# Patient Record
Sex: FEMALE | Race: WHITE | NOT HISPANIC OR LATINO | Employment: FULL TIME | URBAN - METROPOLITAN AREA
[De-identification: names, ages, dates, MRNs, and addresses within clinical notes are randomized per-mention and may not be internally consistent; named-entity substitution may affect disease eponyms.]

---

## 2017-05-31 ENCOUNTER — GENERIC CONVERSION - ENCOUNTER (OUTPATIENT)
Dept: OTHER | Facility: OTHER | Age: 57
End: 2017-05-31

## 2017-06-28 ENCOUNTER — GENERIC CONVERSION - ENCOUNTER (OUTPATIENT)
Dept: OTHER | Facility: OTHER | Age: 57
End: 2017-06-28

## 2017-09-20 ENCOUNTER — GENERIC CONVERSION - ENCOUNTER (OUTPATIENT)
Dept: OTHER | Facility: OTHER | Age: 57
End: 2017-09-20

## 2017-10-18 ENCOUNTER — GENERIC CONVERSION - ENCOUNTER (OUTPATIENT)
Dept: OTHER | Facility: OTHER | Age: 57
End: 2017-10-18

## 2018-01-12 NOTE — MISCELLANEOUS
Message   Recorded as Task   Date: 04/15/2016 10:12 AM, Created By: Usha Quintana   Task Name: Follow Up   Assigned To: Sang Delaney   Regarding Patient: Deep Malagon, Status: Active   Comment:    Olivia Foley - 15 Apr 2016 10:12 AM     TASK CREATED  Caller: Self; General Medical Question; [phone] (Mobile Phone)  Pt called me to schedule a bloodwork follow up appt and also had a question  She found a deer tick attached to her knee for less than 24 hours  It was attached and site looks red  Should an antibiotic be called in [facility]) or should she wait to see Dr Darian Montana on Tuesday 4/19  Please call her at [phone]  [facility] - 15 Apr 2016 10:17 AM     TASK EDITED  Please advise   Sang Delaney - 15 Apr 2016 6:02 PM     TASK EDITED  s/w pt   wlll prophylax but low risk by hx        Signatures   Electronically signed by : Apple Hinds DO;  Apr 15 2016  6:03PM EST                       (Author)

## 2018-01-14 NOTE — RESULT NOTES
Verified Results  [facility]) CMP14+eGFR [id number] 08:50AM Kat Hess     Test Name Result Flag Reference   Glucose, Serum 88 mg/dL  65-99   BUN 11 mg/dL     No patient age and/or gender provided                              Age                Male          Female                           0 - 11 months        3 - 25         3 - 25                           1 - 16 years         11 - 20         9 - 22                          21 - 44 years         10 - 25         8 - 21                          42 - 61 years         10 - 25         8 - 21                          42 - 80 years         8 - 32         8 - 32                              >89 years        8 - 38        11 - 36   Creatinine, Serum 0 66 mg/dL     No patient age and/or gender provided                              Age                Male          Female                           0 - 60 days          44 - 1 19      44 - 1 19                     61 days - 11 months        17 - 1 18      17 - 1 18                           1 -  2 years         19 -   42      19 -   42                           3 -  4 years         26 -   51      26 -   51                           5 -  6 years         30 -   59      30 -   59                           7 -  8 years         37 -   62      37 -   62                           9 - 10 years         39 -   70      39 -   70                          11 - 12 years         42 -   75      42 -   75                          13 - 14 years         49 -   90      49 -   90                              >14 years         76 - 1 27      57 - 1 00   eGFR If NonAfricn Am UNABL1 mL/min/1 73     Unable to calculate GFR  Age and/or sex not provided or age <19 years  old  eGFR If Africn Am UNABL1 mL/min/1 73     Unable to calculate GFR  Age and/or sex not provided or age <19 years  old     BUN/Creatinine Ratio 17     No patient age and/or gender provided                              Age                Male          Female                           0 - 16 years         5 - 32         9 - 21                          22 - 44 years         8 - 23         8 - 21                          42 - 61 years         9 - 21         9 - 21                              >59 years        10 - 22        11 - 26   Sodium, Serum 142 mmol/L  134-144   Potassium, Serum 4 5 mmol/L  3 5-5 2   Chloride, Serum 103 mmol/L     Carbon Dioxide, Total 25 mmol/L  18-29   Calcium, Serum 9 5 mg/dL     No patient age and/or gender provided                              Age                Male          Female                           0 - 10 days        8 6 - 10 4     8 6 - 10 4                     11 days -  1 year        9 2 - 11 0     9 2 - 11 0                           2 - 11 years       9 1 - 10 5     9 1 - 10 5                          12 - 17 years       8 9 - 10 4     8 9 - 10 4                          18 - 59 years       8 7 - 10 2     8 7 - 10 2                              >59 years       8 6 - 10 2     8 7 - 10 3   Protein, Total, Serum 6 7 g/dL  6 0-8 5   Albumin, Serum 4 4 g/dL     No patient age and/or gender provided                              Age                Male          Female                           0 -  2 years       3 4 - 4 2      3 4 - 4 2                           3 - 59 years       3 5 - 5 5      3 5 - 5 5                          60 - 69 years       3 6 - 4 8      3 6 - 4 8                          70 - 79 years       3 5 - 4 8      3 5 - 4 8                          80 - 89 years       3 5 - 4 7      3 5 - 4 7                              >89 years       3 2 - 4 6      3 2 - 4 6   Globulin, Total 2 3 g/dL  1 5-4 5   A/G Ratio 1 9  1 1-2 5   Bilirubin, Total 0 5 mg/dL     No patient age and/or gender provided                              Age                Male          Female                 Newborns, term and near term:                  25 hours old:               0 0 -  8 0     0 0 -  8 0                  48 hours old:               0 0 - 13 2     0 0 - 13 2 67 hours old:               0 0 - 15 6     0 0 - 15 6                  96 hours to 3month old:    0 0 - 16 6     0 0 - 16 6                 Children 1 month and older and                 Adults:                      0 0 -  1 2     0 0 -  1 2   Alkaline Phosphatase, S 58 IU/L     No patient age and/or gender provided                              Age                Male          Female                           0 -  1 day          45 - 111       39 - 111                           2 -  5 days         55 - 119       55 - 119                           6 - 10 days         50 - 229       50 - 229                          6 - 30 days         61 - 414       61 - 414                           1 -  6 months       91 - 445       91 - 445                           7 - 12 months      124 - 341      124 - 341                           1 -  3 years       130 - 317      130 - 317                           4 -  6 years       133 - 309      133 - 309                           7 - 12 years       134 - 349      134 - 349                               13 years       80 - 80       68 - 209                               15 years       80 - 0       62 - 80                               15 years        80 - 254       47 - 121                               12 years        70 - 186       52 - 108                               16 years        64 - 146       39 - 101                               25 years        64 - 127       37 - 101                              >18 years        44 - 117       44 - 117   AST (SGOT) 18 IU/L  0-40   ALT (SGPT) 12 IU/L     No patient age and/or gender provided                              Age                Male          Female                           0 - 11 years         0 - 29         0 - 28                          12 - 17 years         0 - 30         0 - 24                              >17 years         0 - 40         0 - 32     ([address] [facility]) Lipid Panel [id number] 08:50AM yoel Denver Test Name Result Flag Reference   Cholesterol, Total 243 mg/dL     No patient age and/or gender provided                              Age                Male          Female                           0 - 23 years       80 - 80      100 - 80                              >19 years       80 - 80      100 - 199   Triglycerides 132 mg/dL     No patient age and/or gender provided                              Age                Male          Female                           0 -  9 years         0 -  76        0 -  76                          10 - 19 years         0 -  80        0 -  80                              >19 years         0 - 149        0 - 149   HDL Cholesterol 60 mg/dL  >39   According to ATP-III Guidelines, HDL-C >59 mg/dL is considered a  negative risk factor for CHD  VLDL Cholesterol Erich 26 mg/dL  5-40   LDL Cholesterol Calc 157 mg/dL     No patient age and/or gender provided                              Age                Male          Female                           0 - 19 years         0 - 109        0 - 109                              >19 years         0 -  99        0 -  99       Discussion/Summary   Please schedule an office appointment    Dr Dagoberto Gabriel

## 2018-04-18 ENCOUNTER — VBI (OUTPATIENT)
Dept: ADMINISTRATIVE | Facility: OTHER | Age: 58
End: 2018-04-18

## 2018-04-18 NOTE — TELEPHONE ENCOUNTER
[employer]IS [employer] mailed an appointment needed letter on 4/18/18,  Patients last appointment was 4/19/16  Patient is on the [address] [place] [address] High Risk List March 2018 and needs to schedule an appointment for an annual physical per patient's [employer]  If patient states that they no longer follow with [facility] please document call and route encounter to Emeka Gutierrez 99   ty

## 2018-07-15 RX ORDER — NICOTINE 21 MG/24HR
PATCH, TRANSDERMAL 24 HOURS TRANSDERMAL
COMMUNITY
Start: 2016-04-21 | End: 2020-03-10

## 2018-07-15 RX ORDER — MYCOPHENOLATE MOFETIL 250 MG/1
CAPSULE ORAL
COMMUNITY
End: 2020-03-10

## 2018-07-15 RX ORDER — LANOLIN ALCOHOL/MO/W.PET/CERES
1000 CREAM (GRAM) TOPICAL
COMMUNITY

## 2018-07-20 ENCOUNTER — OFFICE VISIT (OUTPATIENT)
Dept: FAMILY MEDICINE CLINIC | Facility: CLINIC | Age: 58
End: 2018-07-20
Payer: COMMERCIAL

## 2018-07-20 VITALS
HEART RATE: 88 BPM | HEIGHT: 63 IN | RESPIRATION RATE: 16 BRPM | SYSTOLIC BLOOD PRESSURE: 112 MMHG | BODY MASS INDEX: 23.57 KG/M2 | TEMPERATURE: 97.7 F | WEIGHT: 133 LBS | DIASTOLIC BLOOD PRESSURE: 78 MMHG

## 2018-07-20 DIAGNOSIS — Z13.1 SCREENING FOR DIABETES MELLITUS (DM): ICD-10-CM

## 2018-07-20 DIAGNOSIS — Z13.83 SCREENING FOR CARDIOVASCULAR, RESPIRATORY, AND GENITOURINARY DISEASES: ICD-10-CM

## 2018-07-20 DIAGNOSIS — Z72.0 TOBACCO USE: ICD-10-CM

## 2018-07-20 DIAGNOSIS — R07.89 CHEST DISCOMFORT: Primary | ICD-10-CM

## 2018-07-20 DIAGNOSIS — Z13.89 SCREENING FOR CARDIOVASCULAR, RESPIRATORY, AND GENITOURINARY DISEASES: ICD-10-CM

## 2018-07-20 DIAGNOSIS — M79.10 MYALGIA: ICD-10-CM

## 2018-07-20 DIAGNOSIS — R53.83 FATIGUE, UNSPECIFIED TYPE: ICD-10-CM

## 2018-07-20 DIAGNOSIS — Z13.6 SCREENING FOR CARDIOVASCULAR, RESPIRATORY, AND GENITOURINARY DISEASES: ICD-10-CM

## 2018-07-20 PROCEDURE — 99214 OFFICE O/P EST MOD 30 MIN: CPT | Performed by: FAMILY MEDICINE

## 2018-07-20 PROCEDURE — 3008F BODY MASS INDEX DOCD: CPT | Performed by: FAMILY MEDICINE

## 2018-07-20 RX ORDER — VARENICLINE TARTRATE 25 MG
KIT ORAL
Qty: 53 TABLET | Refills: 0 | Status: SHIPPED | OUTPATIENT
Start: 2018-07-20 | End: 2018-08-19 | Stop reason: ALTCHOICE

## 2018-07-20 RX ORDER — VARENICLINE TARTRATE 1 MG/1
1 TABLET, FILM COATED ORAL 2 TIMES DAILY
Qty: 60 TABLET | Refills: 1 | Status: SHIPPED | OUTPATIENT
Start: 2018-07-20 | End: 2020-03-10

## 2018-07-20 NOTE — PROGRESS NOTES
Assessment/Plan:    No problem-specific Assessment & Plan notes found for this encounter  Strong fam hx, smoker, suggest possible nuclear stress test  Wants to see Dr Farideh Hughes who sees her sister and saw her mother  tob cessation advised  No tick recalled, she is concerned about lyme disease  tob cessation counseled and chantix risks and use     Diagnoses and all orders for this visit:    Chest discomfort  -     Ambulatory referral to Cardiology; Future    Myalgia  -     CBC and differential; Future  -     Lyme Antibody Profile with reflex to WB; Future    Fatigue, unspecified type  -     TSH, 3rd generation; Future  -     Comprehensive metabolic panel; Future    Screening for diabetes mellitus (DM)    Screening for cardiovascular, respiratory, and genitourinary diseases  -     Lipid Panel with Direct LDL reflex; Future    Tobacco use  -     CT lung screening program; Future  -     varenicline (CHANTIX REJI) 0 5 MG X 11 & 1 MG X 42 tablet; Start 7 days before quit date: take one 0 5mg tablet po qd x 3 days, then increase to one 0 5mg tablet bid for 3 days, then increase to one 1mg tablet bid   -     varenicline (CHANTIX) 1 mg tablet; Take 1 tablet (1 mg total) by mouth 2 (two) times a day    Other orders  -     nicotine (NICODERM CQ) 21 mg/24 hr TD 24 hr patch; Place on the skin  -     cyanocobalamin (VITAMIN B-12) 1,000 mcg tablet; Take 1,000 mcg by mouth  -     mycophenolate (CELLCEPT) 250 mg capsule; Take by mouth              No Follow-up on file  Subjective:      Patient ID: Joseph Elaine is a 61 y o  female  Chief Complaint   Patient presents with    Fatigue     fatigue, bilateral arm pain, pt sts felt horrible last week went [facility] ED [facility] with [facility] pain        HPI  Has pemphigus  On cellcept  1yr  Derm is Dr Jorge Meyers in [address] test 3y ago, treadmill type non nuclear  Left arm aches  Right rotator cuff surgery x 2  Some upper muscle weakness b/l  Heart racing?   Was in hosp  Fatigue  Was at [facility]  Still smoking  On vit D  Last colon <5y Dr Destin Johns    The following portions of the patient's history were reviewed and updated as appropriate: allergies, current medications, past family history, past medical history, past social history, past surgical history and problem list     Review of Systems   Constitutional: Negative for fever  Neurological: Negative for syncope and light-headedness  Current Outpatient Prescriptions   Medication Sig Dispense Refill    cyanocobalamin (VITAMIN B-12) 1,000 mcg tablet Take 1,000 mcg by mouth      mycophenolate (CELLCEPT) 250 mg capsule Take by mouth      nicotine (NICODERM CQ) 21 mg/24 hr TD 24 hr patch Place on the skin      varenicline (CHANTIX REJI) 0 5 MG X 11 & 1 MG X 42 tablet Start 7 days before quit date: take one 0 5mg tablet po qd x 3 days, then increase to one 0 5mg tablet bid for 3 days, then increase to one 1mg tablet bid  53 tablet 0    varenicline (CHANTIX) 1 mg tablet Take 1 tablet (1 mg total) by mouth 2 (two) times a day 60 tablet 1     No current facility-administered medications for this visit  Objective:    /78   Pulse 88   Temp 97 7 °F (36 5 °C)   Resp 16   Ht 5' 3" (1 6 m)   Wt 60 3 kg (133 lb)   BMI 23 56 kg/m²        Physical Exam   Constitutional: She appears well-developed  No distress  HENT:   Head: Normocephalic  Mouth/Throat: No oropharyngeal exudate  Eyes: Conjunctivae are normal  No scleral icterus  Neck: Neck supple  Cardiovascular: Normal rate and intact distal pulses  No murmur heard  Pulmonary/Chest: Effort normal  No respiratory distress  She has no wheezes  She has no rales  Abdominal: Soft  There is no tenderness  There is no rebound  Musculoskeletal: She exhibits no edema or deformity  Lymphadenopathy:     She has no cervical adenopathy  Neurological: She is alert  She exhibits normal muscle tone  Skin: Skin is warm and dry  No rash noted  No pallor  Psychiatric: Her behavior is normal  Thought content normal    Nursing note and vitals reviewed               Evan Krishna DO

## 2018-08-03 LAB
ALBUMIN SERPL-MCNC: 4.6 G/DL (ref 3.5–5.5)
ALBUMIN/GLOB SERPL: 2.3 {RATIO} (ref 1.2–2.2)
ALP SERPL-CCNC: 51 IU/L (ref 39–117)
ALT SERPL-CCNC: 14 IU/L (ref 0–32)
AMBIG ABBREV DEFAULT: NORMAL
AST SERPL-CCNC: 18 IU/L (ref 0–40)
B BURGDOR IGG+IGM SER-ACNC: <0.91 ISR (ref 0–0.9)
B BURGDOR IGM SER IA-ACNC: <0.8 INDEX (ref 0–0.79)
BASOPHILS # BLD AUTO: 0 X10E3/UL (ref 0–0.2)
BASOPHILS NFR BLD AUTO: 1 %
BILIRUB SERPL-MCNC: 0.6 MG/DL (ref 0–1.2)
BUN SERPL-MCNC: 11 MG/DL (ref 6–24)
BUN/CREAT SERPL: 14 (ref 9–23)
CALCIUM SERPL-MCNC: 9.5 MG/DL (ref 8.7–10.2)
CHLORIDE SERPL-SCNC: 102 MMOL/L (ref 96–106)
CHOLEST SERPL-MCNC: 245 MG/DL (ref 100–199)
CO2 SERPL-SCNC: 24 MMOL/L (ref 20–29)
CREAT SERPL-MCNC: 0.78 MG/DL (ref 0.57–1)
EOSINOPHIL # BLD AUTO: 0.1 X10E3/UL (ref 0–0.4)
EOSINOPHIL NFR BLD AUTO: 1 %
ERYTHROCYTE [DISTWIDTH] IN BLOOD BY AUTOMATED COUNT: 13.7 % (ref 12.3–15.4)
GLOBULIN SER-MCNC: 2 G/DL (ref 1.5–4.5)
GLUCOSE SERPL-MCNC: 92 MG/DL (ref 65–99)
HCT VFR BLD AUTO: 41 % (ref 34–46.6)
HDLC SERPL-MCNC: 77 MG/DL
HGB BLD-MCNC: 13.6 G/DL (ref 11.1–15.9)
IMM GRANULOCYTES # BLD: 0 X10E3/UL (ref 0–0.1)
IMM GRANULOCYTES NFR BLD: 0 %
LDLC SERPL CALC-MCNC: 151 MG/DL (ref 0–99)
LYMPHOCYTES # BLD AUTO: 1.7 X10E3/UL (ref 0.7–3.1)
LYMPHOCYTES NFR BLD AUTO: 31 %
MCH RBC QN AUTO: 31 PG (ref 26.6–33)
MCHC RBC AUTO-ENTMCNC: 33.2 G/DL (ref 31.5–35.7)
MCV RBC AUTO: 93 FL (ref 79–97)
MICRODELETION SYND BLD/T FISH: NORMAL
MONOCYTES # BLD AUTO: 0.5 X10E3/UL (ref 0.1–0.9)
MONOCYTES NFR BLD AUTO: 10 %
NEUTROPHILS # BLD AUTO: 3 X10E3/UL (ref 1.4–7)
NEUTROPHILS NFR BLD AUTO: 57 %
PLATELET # BLD AUTO: 179 X10E3/UL (ref 150–379)
POTASSIUM SERPL-SCNC: 4.1 MMOL/L (ref 3.5–5.2)
PROT SERPL-MCNC: 6.6 G/DL (ref 6–8.5)
RBC # BLD AUTO: 4.39 X10E6/UL (ref 3.77–5.28)
SL AMB EGFR AFRICAN AMERICAN: 97 ML/MIN/1.73
SL AMB EGFR NON AFRICAN AMERICAN: 84 ML/MIN/1.73
SODIUM SERPL-SCNC: 142 MMOL/L (ref 134–144)
TRIGL SERPL-MCNC: 86 MG/DL (ref 0–149)
TSH SERPL DL<=0.005 MIU/L-ACNC: 0.59 UIU/ML (ref 0.45–4.5)
WBC # BLD AUTO: 5.3 X10E3/UL (ref 3.4–10.8)

## 2018-08-08 ENCOUNTER — HOSPITAL ENCOUNTER (OUTPATIENT)
Dept: RADIOLOGY | Facility: HOSPITAL | Age: 58
Discharge: HOME | End: 2018-08-08
Attending: FAMILY MEDICINE
Payer: COMMERCIAL

## 2018-08-08 DIAGNOSIS — Z72.0 TOBACCO USE: ICD-10-CM

## 2018-08-10 PROBLEM — R91.1 NODULE OF LEFT LUNG: Status: ACTIVE | Noted: 2018-08-10

## 2018-08-13 PROBLEM — Z91.89 FRAMINGHAM CARDIAC RISK <10% IN NEXT 10 YEARS: Status: ACTIVE | Noted: 2018-08-13

## 2020-03-10 ENCOUNTER — OFFICE VISIT (OUTPATIENT)
Dept: FAMILY MEDICINE CLINIC | Facility: CLINIC | Age: 60
End: 2020-03-10
Payer: COMMERCIAL

## 2020-03-10 VITALS
BODY MASS INDEX: 23.92 KG/M2 | RESPIRATION RATE: 16 BRPM | HEIGHT: 63 IN | OXYGEN SATURATION: 96 % | DIASTOLIC BLOOD PRESSURE: 72 MMHG | SYSTOLIC BLOOD PRESSURE: 106 MMHG | TEMPERATURE: 98.1 F | WEIGHT: 135 LBS | HEART RATE: 84 BPM

## 2020-03-10 DIAGNOSIS — Z13.89 SCREENING FOR CARDIOVASCULAR, RESPIRATORY, AND GENITOURINARY DISEASES: ICD-10-CM

## 2020-03-10 DIAGNOSIS — J01.00 ACUTE NON-RECURRENT MAXILLARY SINUSITIS: Primary | ICD-10-CM

## 2020-03-10 DIAGNOSIS — Z13.83 SCREENING FOR CARDIOVASCULAR, RESPIRATORY, AND GENITOURINARY DISEASES: ICD-10-CM

## 2020-03-10 DIAGNOSIS — Z13.6 SCREENING FOR CARDIOVASCULAR, RESPIRATORY, AND GENITOURINARY DISEASES: ICD-10-CM

## 2020-03-10 DIAGNOSIS — Z72.0 TOBACCO ABUSE: ICD-10-CM

## 2020-03-10 DIAGNOSIS — Z13.1 SCREENING FOR DIABETES MELLITUS (DM): ICD-10-CM

## 2020-03-10 PROBLEM — R07.89 CHEST DISCOMFORT: Status: RESOLVED | Noted: 2018-07-20 | Resolved: 2020-03-10

## 2020-03-10 PROCEDURE — 3008F BODY MASS INDEX DOCD: CPT | Performed by: FAMILY MEDICINE

## 2020-03-10 PROCEDURE — 99214 OFFICE O/P EST MOD 30 MIN: CPT | Performed by: FAMILY MEDICINE

## 2020-03-10 RX ORDER — VARENICLINE TARTRATE 25 MG
KIT ORAL
Qty: 53 TABLET | Refills: 0 | Status: SHIPPED | OUTPATIENT
Start: 2020-03-10 | End: 2020-12-23 | Stop reason: SDUPTHER

## 2020-03-10 RX ORDER — CEFDINIR 300 MG/1
600 CAPSULE ORAL DAILY
Qty: 20 CAPSULE | Refills: 0 | Status: SHIPPED | OUTPATIENT
Start: 2020-03-10 | End: 2020-03-20

## 2020-03-10 RX ORDER — VARENICLINE TARTRATE 1 MG/1
1 TABLET, FILM COATED ORAL 2 TIMES DAILY
Qty: 60 TABLET | Refills: 1 | Status: SHIPPED | OUTPATIENT
Start: 2020-03-10 | End: 2020-12-23 | Stop reason: SDUPTHER

## 2020-03-10 NOTE — PROGRESS NOTES
Assessment/Plan:    No problem-specific Assessment & Plan notes found for this encounter  Sinusitis new  tob cessation plan and med risks reviewed, never took prior chantix rx  F/u for cpe after labs     Diagnoses and all orders for this visit:    Acute non-recurrent maxillary sinusitis  -     cefdinir (OMNICEF) 300 mg capsule; Take 2 capsules (600 mg total) by mouth daily for 10 days    Tobacco abuse  -     varenicline (CHANTIX REJI) 0 5 MG X 11 & 1 MG X 42 tablet; Start 7 days before quit date: take 0 5mg tab/d x 3 days, then 0 5mg tab bid x 3 days, then 1mg tab bid<starter pack>  -     varenicline (CHANTIX) 1 mg tablet; Take 1 tablet (1 mg total) by mouth 2 (two) times a day    Screening for cardiovascular, respiratory, and genitourinary diseases  -     Lipid Panel with Direct LDL reflex; Future    Screening for diabetes mellitus (DM)  -     Comprehensive metabolic panel; Future          Return in about 1 month (around 4/10/2020) for Annual physical     Subjective:      Patient ID: Timothy Waterman is a 60 y o  female  Chief Complaint   Patient presents with    Chills     symptoms are on and off since 2 weeks ago  vfrma    Nasal Congestion    Cough    Generalized Body Aches       HPI  Uri sx  2w  Head and nasal congestion  Fever and chills  No thermometer  Aches  Discolored bloody mucus nasal and chest  Red with yellow with orange  Some otc    Gyn is Dr Donavon Ayala, [address], in [place], off of [address]    Dr Rajinder Dinh did colon last year 2019    The following portions of the patient's history were reviewed and updated as appropriate: allergies, current medications, past family history, past medical history, past social history, past surgical history and problem list     Review of Systems   Constitutional: Positive for chills and fever  Respiratory: Positive for cough            Current Outpatient Medications   Medication Sig Dispense Refill    cyanocobalamin (VITAMIN B-12) 1,000 mcg tablet Take 1,000 mcg by mouth      cefdinir (OMNICEF) 300 mg capsule Take 2 capsules (600 mg total) by mouth daily for 10 days 20 capsule 0    varenicline (CHANTIX REJI) 0 5 MG X 11 & 1 MG X 42 tablet Start 7 days before quit date: take 0 5mg tab/d x 3 days, then 0 5mg tab bid x 3 days, then 1mg tab bid<starter pack> 53 tablet 0    varenicline (CHANTIX) 1 mg tablet Take 1 tablet (1 mg total) by mouth 2 (two) times a day 60 tablet 1     No current facility-administered medications for this visit  Objective:    /72   Pulse 84   Temp 98 1 °F (36 7 °C)   Resp 16   Ht 5' 3" (1 6 m)   Wt 61 2 kg (135 lb)   SpO2 96%   BMI 23 91 kg/m²        Physical Exam   Constitutional: She appears well-developed  No distress  HENT:   Head: Normocephalic  Right Ear: External ear normal    Left Ear: External ear normal    Sinuses tender to percussion, nasal turbinates visualized and appear red and swollen     Eyes: Conjunctivae are normal  No scleral icterus  Neck: Neck supple  Cardiovascular: Normal rate, regular rhythm and intact distal pulses  Pulmonary/Chest: Effort normal  No respiratory distress  Coarse midline cough present  Abdominal: Soft  Bowel sounds are normal    Musculoskeletal: She exhibits no edema or deformity  Neurological: She is alert  She exhibits normal muscle tone  Skin: Skin is warm and dry  No pallor  Psychiatric: Her behavior is normal  Thought content normal    Nursing note and vitals reviewed                Juan Pablo Stewart DO

## 2020-03-11 ENCOUNTER — TELEPHONE (OUTPATIENT)
Dept: ADMINISTRATIVE | Facility: OTHER | Age: 60
End: 2020-03-11

## 2020-03-11 NOTE — LETTER
Procedure Request Form: Colonoscopy      Date Requested: 20  Patient: [address]  Patient : 1960   Referring Provider: Remonia Lunch, DO        Date of Procedure ______________________________       The above patient has informed us that they have completed their   most recent Colonoscopy at your facility  Please complete   this form and attach all corresponding procedure reports/results  Comments __________________________________________________________  ____________________________________________________________________  ____________________________________________________________________  ____________________________________________________________________    Facility Completing Procedure _________________________________________    Form Completed By (print name) _______________________________________      Signature __________________________________________________________      These reports are needed for  compliance    Please fax this completed form and a copy of the procedure report to our office located at [place] [address] as soon as possible to [phone] [facility]: Phone [phone]    We thank you for your assistance in treating our mutual patient

## 2020-03-11 NOTE — TELEPHONE ENCOUNTER
----- Message from Candelario Khanna DO sent at 3/10/2020 12:20 PM EDT -----  03/10/20 12:20 PM    Hello, our patient Jaswinder Crew has had CRC: Colonoscopy and Pap Smear (HPV) aka Cervical Cancer Screening completed/performed  Please assist in updating the patient chart by making an External outreach to Gyn is Dr Ravin Vieira, [address] [facility], in [place], [place] of [address] facility located in [place] and GI is Dr Marina Lino in [place] for colonoscopy   The date of service is recent    Thank you,  Candelario Khanna DO  [address]

## 2020-03-11 NOTE — TELEPHONE ENCOUNTER
Upon review of the In Basket request and the patient's chart, initial outreach has been made via fax, please see Contacts section for details  A second outreach attempt will be made within 3 business days  1  Dr Laird Mt [phone]  2    [address] [facility]   [phone]       Thank you  Jose Busby MA

## 2020-03-11 NOTE — LETTER
Procedure Request Form: Cervical Cancer Screening      Date Requested: 20  Patient: [address]  Patient : 1960   Referring Provider: Clary Lazar, DO        Date of Procedure ______________________________       The above patient has informed us that they have completed their   most recent Cervical Cancer Screening at your facility  Please complete   this form and attach all corresponding procedure reports/results  Comments __________________________________________________________  ____________________________________________________________________  ____________________________________________________________________  ____________________________________________________________________    Facility Completing Procedure _________________________________________    Form Completed By (print name) _______________________________________      Signature __________________________________________________________      These reports are needed for  compliance    Please fax this completed form and a copy of the procedure report to our office located at [place] [address] as soon as possible to [phone] attention Massimo Ayala: Phone [phone]    We thank you for your assistance in treating our mutual patient

## 2020-03-17 NOTE — TELEPHONE ENCOUNTER
Upon review of the In Basket request we were able to locate, review, and update the patient chart as requested for CRC: Colonoscopy  Any additional questions or concerns should be emailed to the Practice Liaisons via [email or web address] email, please do not reply via In Basket      Thank you  Rakel Urbina MA

## 2020-05-14 LAB
ALBUMIN SERPL-MCNC: 4.8 G/DL (ref 3.8–4.9)
ALBUMIN/GLOB SERPL: 2.2 {RATIO} (ref 1.2–2.2)
ALP SERPL-CCNC: 50 IU/L (ref 39–117)
ALT SERPL-CCNC: 17 IU/L (ref 0–32)
AST SERPL-CCNC: 21 IU/L (ref 0–40)
BILIRUB SERPL-MCNC: 0.6 MG/DL (ref 0–1.2)
BUN SERPL-MCNC: 12 MG/DL (ref 8–27)
BUN/CREAT SERPL: 17 (ref 12–28)
CALCIUM SERPL-MCNC: 9.4 MG/DL (ref 8.7–10.3)
CHLORIDE SERPL-SCNC: 105 MMOL/L (ref 96–106)
CHOLEST SERPL-MCNC: 253 MG/DL (ref 100–199)
CO2 SERPL-SCNC: 24 MMOL/L (ref 20–29)
CREAT SERPL-MCNC: 0.69 MG/DL (ref 0.57–1)
GLOBULIN SER-MCNC: 2.2 G/DL (ref 1.5–4.5)
GLUCOSE SERPL-MCNC: 86 MG/DL (ref 65–99)
HDLC SERPL-MCNC: 77 MG/DL
LDLC SERPL CALC-MCNC: 155 MG/DL (ref 0–99)
MICRODELETION SYND BLD/T FISH: NORMAL
POTASSIUM SERPL-SCNC: 4.1 MMOL/L (ref 3.5–5.2)
PROT SERPL-MCNC: 7 G/DL (ref 6–8.5)
SL AMB EGFR AFRICAN AMERICAN: 109 ML/MIN/1.73
SL AMB EGFR NON AFRICAN AMERICAN: 95 ML/MIN/1.73
SODIUM SERPL-SCNC: 142 MMOL/L (ref 134–144)
TRIGL SERPL-MCNC: 107 MG/DL (ref 0–149)

## 2020-12-23 ENCOUNTER — OFFICE VISIT (OUTPATIENT)
Dept: FAMILY MEDICINE CLINIC | Facility: CLINIC | Age: 60
End: 2020-12-23
Payer: COMMERCIAL

## 2020-12-23 VITALS
RESPIRATION RATE: 16 BRPM | BODY MASS INDEX: 23.21 KG/M2 | WEIGHT: 131 LBS | DIASTOLIC BLOOD PRESSURE: 78 MMHG | SYSTOLIC BLOOD PRESSURE: 126 MMHG | TEMPERATURE: 97.6 F | HEIGHT: 63 IN | HEART RATE: 76 BPM

## 2020-12-23 DIAGNOSIS — Z91.89 FRAMINGHAM CARDIAC RISK <10% IN NEXT 10 YEARS: ICD-10-CM

## 2020-12-23 DIAGNOSIS — Z72.0 TOBACCO ABUSE: ICD-10-CM

## 2020-12-23 DIAGNOSIS — M53.3 TAIL BONE PAIN: Primary | ICD-10-CM

## 2020-12-23 PROBLEM — J01.00 ACUTE NON-RECURRENT MAXILLARY SINUSITIS: Status: RESOLVED | Noted: 2020-03-10 | Resolved: 2020-12-23

## 2020-12-23 PROBLEM — M79.10 MYALGIA: Status: RESOLVED | Noted: 2018-07-20 | Resolved: 2020-12-23

## 2020-12-23 PROCEDURE — 3008F BODY MASS INDEX DOCD: CPT | Performed by: FAMILY MEDICINE

## 2020-12-23 PROCEDURE — 99214 OFFICE O/P EST MOD 30 MIN: CPT | Performed by: FAMILY MEDICINE

## 2020-12-23 PROCEDURE — 3725F SCREEN DEPRESSION PERFORMED: CPT | Performed by: FAMILY MEDICINE

## 2020-12-23 RX ORDER — VARENICLINE TARTRATE 1 MG/1
1 TABLET, FILM COATED ORAL 2 TIMES DAILY
Qty: 60 TABLET | Refills: 1 | Status: SHIPPED | OUTPATIENT
Start: 2020-12-23 | End: 2021-06-03

## 2020-12-23 RX ORDER — VARENICLINE TARTRATE 25 MG
KIT ORAL
Qty: 53 TABLET | Refills: 0 | Status: SHIPPED | OUTPATIENT
Start: 2020-12-23 | End: 2021-06-03 | Stop reason: SDUPTHER

## 2021-01-07 ENCOUNTER — APPOINTMENT (OUTPATIENT)
Dept: RADIOLOGY | Facility: CLINIC | Age: 61
End: 2021-01-07
Payer: COMMERCIAL

## 2021-01-07 DIAGNOSIS — M53.3 TAIL BONE PAIN: ICD-10-CM

## 2021-01-07 PROCEDURE — 72220 X-RAY EXAM SACRUM TAILBONE: CPT

## 2021-06-03 DIAGNOSIS — Z72.0 TOBACCO ABUSE: ICD-10-CM

## 2021-06-03 RX ORDER — VARENICLINE TARTRATE 25 MG
KIT ORAL
Qty: 53 TABLET | Refills: 0 | Status: SHIPPED | OUTPATIENT
Start: 2021-06-03 | End: 2021-07-03

## 2022-07-26 ENCOUNTER — TELEPHONE (OUTPATIENT)
Dept: FAMILY MEDICINE CLINIC | Facility: CLINIC | Age: 62
End: 2022-07-26

## 2022-07-26 NOTE — TELEPHONE ENCOUNTER
Patient not seen since 2020  Please verify pcp  She will need a CPE  LMOM for a call back     Roland Arredondo MA

## 2023-01-25 ENCOUNTER — RA CDI HCC (OUTPATIENT)
Dept: OTHER | Facility: HOSPITAL | Age: 63
End: 2023-01-25

## 2023-01-25 NOTE — PROGRESS NOTES
Kathy [facility]  coding opportunities       Chart reviewed, no opportunity found: CHART REVIEWED, NO OPPORTUNITY FOUND        Patients Insurance        Commercial Insurance: Thomas Fernandez

## 2023-01-28 PROBLEM — Z00.00 HEALTHCARE MAINTENANCE: Status: ACTIVE | Noted: 2023-01-28

## 2023-01-28 PROBLEM — M53.3 TAIL BONE PAIN: Status: RESOLVED | Noted: 2020-12-23 | Resolved: 2023-01-28

## 2023-01-28 PROBLEM — R53.83 FATIGUE: Status: RESOLVED | Noted: 2018-07-20 | Resolved: 2023-01-28

## 2023-01-28 PROBLEM — Z12.31 BREAST CANCER SCREENING BY MAMMOGRAM: Status: ACTIVE | Noted: 2023-01-28

## 2023-01-28 PROBLEM — Z91.89 FRAMINGHAM CARDIAC RISK <10% IN NEXT 10 YEARS: Status: RESOLVED | Noted: 2018-08-13 | Resolved: 2023-01-28

## 2023-02-01 ENCOUNTER — OFFICE VISIT (OUTPATIENT)
Dept: FAMILY MEDICINE CLINIC | Facility: CLINIC | Age: 63
End: 2023-02-01

## 2023-02-01 VITALS
RESPIRATION RATE: 16 BRPM | OXYGEN SATURATION: 99 % | HEIGHT: 63 IN | DIASTOLIC BLOOD PRESSURE: 80 MMHG | TEMPERATURE: 97.9 F | BODY MASS INDEX: 22.32 KG/M2 | SYSTOLIC BLOOD PRESSURE: 120 MMHG | WEIGHT: 126 LBS | HEART RATE: 79 BPM

## 2023-02-01 DIAGNOSIS — Z00.00 HEALTHCARE MAINTENANCE: Primary | ICD-10-CM

## 2023-02-01 DIAGNOSIS — Z13.89 SCREENING FOR CARDIOVASCULAR, RESPIRATORY, AND GENITOURINARY DISEASES: ICD-10-CM

## 2023-02-01 DIAGNOSIS — Z72.0 TOBACCO ABUSE: ICD-10-CM

## 2023-02-01 DIAGNOSIS — Z13.83 SCREENING FOR CARDIOVASCULAR, RESPIRATORY, AND GENITOURINARY DISEASES: ICD-10-CM

## 2023-02-01 DIAGNOSIS — Z13.6 SCREENING FOR CARDIOVASCULAR, RESPIRATORY, AND GENITOURINARY DISEASES: ICD-10-CM

## 2023-02-01 DIAGNOSIS — Z13.1 SCREENING FOR DIABETES MELLITUS (DM): ICD-10-CM

## 2023-02-01 DIAGNOSIS — Z23 IMMUNIZATION DUE: ICD-10-CM

## 2023-02-01 DIAGNOSIS — Z12.31 BREAST CANCER SCREENING BY MAMMOGRAM: ICD-10-CM

## 2023-02-01 RX ORDER — MELATONIN
1000 DAILY
COMMUNITY

## 2023-02-01 RX ORDER — CALCIUM GLUCONATE 45(500) MG
500 TABLET ORAL DAILY
COMMUNITY

## 2023-02-01 RX ORDER — VARENICLINE TARTRATE 25 MG
KIT ORAL
Qty: 53 TABLET | Refills: 0 | Status: SHIPPED | OUTPATIENT
Start: 2023-02-01 | End: 2023-03-03

## 2023-02-01 RX ORDER — ZINC GLUCONATE 50 MG
50 TABLET ORAL DAILY
COMMUNITY

## 2023-02-01 NOTE — PROGRESS NOTES
Assessment/Plan:    No problem-specific Assessment & Plan notes found for this encounter  cpe  Update screening  Tobacco risks aware  chantix plan/risks/benefits explained and accepted     Diagnoses and all orders for this visit:    Healthcare maintenance    Breast cancer screening by mammogram  -     Mammo screening bilateral w 3d & cad; Future    Screening for cardiovascular, respiratory, and genitourinary diseases  -     Lipid Panel with Direct LDL reflex; Future    Screening for diabetes mellitus (DM)  -     Comprehensive metabolic panel; Future    Tobacco abuse  -     CT lung screening program; Future  -     varenicline 0 5 MG X 11 & 1 MG X 42 tablet therapy pack; Start 7 days before quit date: take 0 5mg tab/d x 3 days, then 0 5mg tab bid x 3 days, then 1mg tab bid<starter pack>    Immunization due  -     TDAP VACCINE GREATER THAN OR EQUAL TO 8YO IM    Other orders  -     zinc gluconate 50 mg tablet; Take 50 mg by mouth daily  -     calcium gluconate 500 mg tablet; Take 500 mg by mouth daily  -     cholecalciferol (VITAMIN D3) 1,000 units tablet; Take 1,000 Units by mouth daily              Return if symptoms worsen or fail to improve  Subjective:      Patient ID: Tammy Russell is a 60 y o  female  Chief Complaint   Patient presents with   • Physical Exam     wmcma       HPI    Plans to quit tob  Moderation in diet  Low salt  Carb aware    Does exercise now and then    Drinks water  etoh few times a week  Hard liquor    Dr Mcdonnell Shoulder did colonoscopy in past 2 years    The following portions of the patient's history were reviewed and updated as appropriate: allergies, current medications, past family history, past medical history, past social history, past surgical history and problem list     Review of Systems   Constitutional: Negative for chills and fever           Current Outpatient Medications   Medication Sig Dispense Refill   • calcium gluconate 500 mg tablet Take 500 mg by mouth daily     • cholecalciferol (VITAMIN D3) 1,000 units tablet Take 1,000 Units by mouth daily     • varenicline 0 5 MG X 11 & 1 MG X 42 tablet therapy pack Start 7 days before quit date: take 0 5mg tab/d x 3 days, then 0 5mg tab bid x 3 days, then 1mg tab bid<starter pack> 53 tablet 0   • zinc gluconate 50 mg tablet Take 50 mg by mouth daily       No current facility-administered medications for this visit  Objective:    /80 (BP Location: Right arm, Patient Position: Sitting, Cuff Size: Standard)   Pulse 79   Temp 97 9 °F (36 6 °C) (Temporal)   Resp 16   Ht 5' 3 15" (1 604 m)   Wt 57 2 kg (126 lb)   SpO2 99%   BMI 22 21 kg/m²        Physical Exam  Vitals and nursing note reviewed  Constitutional:       General: She is not in acute distress  Appearance: She is well-developed  She is not ill-appearing  HENT:      Head: Normocephalic  Right Ear: Tympanic membrane normal       Left Ear: Tympanic membrane normal    Eyes:      General: No scleral icterus  Conjunctiva/sclera: Conjunctivae normal    Cardiovascular:      Rate and Rhythm: Normal rate and regular rhythm  Pulmonary:      Effort: Pulmonary effort is normal  No respiratory distress  Breath sounds: No wheezing  Abdominal:      General: There is no distension  Palpations: Abdomen is soft  Tenderness: There is no abdominal tenderness  Musculoskeletal:         General: No deformity  Cervical back: Neck supple  Right lower leg: No edema  Left lower leg: No edema  Skin:     General: Skin is warm and dry  Coloration: Skin is not pale  Neurological:      Mental Status: She is alert  Motor: No weakness  Gait: Gait normal    Psychiatric:         Mood and Affect: Mood normal          Behavior: Behavior normal          Thought Content:  Thought content normal                 Treva Lindsey DO

## 2023-02-02 ENCOUNTER — TELEPHONE (OUTPATIENT)
Dept: ADMINISTRATIVE | Facility: OTHER | Age: 63
End: 2023-02-02

## 2023-02-02 NOTE — TELEPHONE ENCOUNTER
----- Message from Billy Moreno DO sent at 2/1/2023  9:50 PM EST -----  02/01/23 9:50 PM    Hello, our patient Dominique Chakraborty has had CRC: Colonoscopy completed/performed  Please assist in updating the patient chart by making an External outreach to DR Siva You located in [facility]  The date of service is in the past 2 years      Thank you,  Billy Moreno,   [place] [id number]

## 2023-02-02 NOTE — TELEPHONE ENCOUNTER
Upon review of the In Basket request we were able to locate, review, and update the patient chart as requested for CRC: Colonoscopy  Any additional questions or concerns should be emailed to the Practice Liaisons via the appropriate education email address, please do not reply via In Basket      Thank you  Christina Yadav

## 2023-03-01 ENCOUNTER — HOSPITAL ENCOUNTER (OUTPATIENT)
Dept: RADIOLOGY | Facility: HOSPITAL | Age: 63
Discharge: HOME | End: 2023-03-01
Attending: FAMILY MEDICINE

## 2023-03-01 DIAGNOSIS — Z72.0 TOBACCO ABUSE: ICD-10-CM

## 2023-03-02 LAB
ALBUMIN SERPL-MCNC: 4.7 G/DL (ref 3.8–4.8)
ALBUMIN/GLOB SERPL: 2 {RATIO} (ref 1.2–2.2)
ALP SERPL-CCNC: 58 IU/L (ref 44–121)
ALT SERPL-CCNC: 17 IU/L (ref 0–32)
AST SERPL-CCNC: 20 IU/L (ref 0–40)
BILIRUB SERPL-MCNC: 0.6 MG/DL (ref 0–1.2)
BUN SERPL-MCNC: 15 MG/DL (ref 8–27)
BUN/CREAT SERPL: 17 (ref 12–28)
CALCIUM SERPL-MCNC: 9.4 MG/DL (ref 8.7–10.3)
CHLORIDE SERPL-SCNC: 102 MMOL/L (ref 96–106)
CHOLEST SERPL-MCNC: 236 MG/DL (ref 100–199)
CO2 SERPL-SCNC: 26 MMOL/L (ref 20–29)
CREAT SERPL-MCNC: 0.9 MG/DL (ref 0.57–1)
EGFR: 72 ML/MIN/1.73
GLOBULIN SER-MCNC: 2.3 G/DL (ref 1.5–4.5)
GLUCOSE SERPL-MCNC: 89 MG/DL (ref 70–99)
HDLC SERPL-MCNC: 76 MG/DL
LDLC SERPL CALC-MCNC: 144 MG/DL (ref 0–99)
MICRODELETION SYND BLD/T FISH: NORMAL
POTASSIUM SERPL-SCNC: 4.2 MMOL/L (ref 3.5–5.2)
PROT SERPL-MCNC: 7 G/DL (ref 6–8.5)
SODIUM SERPL-SCNC: 142 MMOL/L (ref 134–144)
TRIGL SERPL-MCNC: 94 MG/DL (ref 0–149)

## 2023-03-29 PROBLEM — Z00.00 HEALTHCARE MAINTENANCE: Status: RESOLVED | Noted: 2023-01-28 | Resolved: 2023-03-29

## 2023-05-03 DIAGNOSIS — Z72.0 TOBACCO ABUSE: ICD-10-CM

## 2023-05-03 RX ORDER — VARENICLINE TARTRATE 25 MG
KIT ORAL
Qty: 53 TABLET | Refills: 0 | Status: SHIPPED | OUTPATIENT
Start: 2023-05-03 | End: 2023-06-02

## 2023-07-18 DIAGNOSIS — Z12.31 BREAST CANCER SCREENING BY MAMMOGRAM: ICD-10-CM

## 2024-02-21 PROBLEM — Z13.6 SCREENING FOR CARDIOVASCULAR, RESPIRATORY, AND GENITOURINARY DISEASES: Status: RESOLVED | Noted: 2018-07-20 | Resolved: 2024-02-21

## 2024-02-21 PROBLEM — Z13.83 SCREENING FOR CARDIOVASCULAR, RESPIRATORY, AND GENITOURINARY DISEASES: Status: RESOLVED | Noted: 2018-07-20 | Resolved: 2024-02-21

## 2024-02-21 PROBLEM — Z13.89 SCREENING FOR CARDIOVASCULAR, RESPIRATORY, AND GENITOURINARY DISEASES: Status: RESOLVED | Noted: 2018-07-20 | Resolved: 2024-02-21

## 2024-02-21 PROBLEM — Z13.1 SCREENING FOR DIABETES MELLITUS (DM): Status: RESOLVED | Noted: 2018-07-20 | Resolved: 2024-02-21

## 2024-07-19 ENCOUNTER — TELEPHONE (OUTPATIENT)
Dept: GASTROENTEROLOGY | Facility: CLINIC | Age: 64
End: 2024-07-19

## 2024-07-19 NOTE — TELEPHONE ENCOUNTER
Pt is due for a colonoscopy for hx of colonic polyps (Dr Mcdermott pt). I lmom for pt to please call back to schedule with one of our GI providers as Dr Mcdermott has retired. Will call again if do not hear back from pt.

## 2024-07-26 NOTE — TELEPHONE ENCOUNTER
I lmom for pt to please call back to schedule with one of our GI providers as Dr Mcdermott has retired. Will call again if do not hear back from pt.

## 2025-01-16 ENCOUNTER — HOSPITAL ENCOUNTER (EMERGENCY)
Facility: HOSPITAL | Age: 65
Discharge: HOME | End: 2025-01-16
Payer: COMMERCIAL

## 2025-01-16 ENCOUNTER — APPOINTMENT (EMERGENCY)
Dept: RADIOLOGY | Facility: HOSPITAL | Age: 65
End: 2025-01-16
Payer: COMMERCIAL

## 2025-01-16 VITALS
DIASTOLIC BLOOD PRESSURE: 77 MMHG | WEIGHT: 103 LBS | OXYGEN SATURATION: 96 % | BODY MASS INDEX: 18.16 KG/M2 | HEART RATE: 67 BPM | TEMPERATURE: 98.1 F | RESPIRATION RATE: 24 BRPM | SYSTOLIC BLOOD PRESSURE: 122 MMHG

## 2025-01-16 DIAGNOSIS — R07.9 CHEST PAIN: Primary | ICD-10-CM

## 2025-01-16 LAB
ALBUMIN SERPL BCG-MCNC: 4.5 G/DL (ref 3.5–5)
ALP SERPL-CCNC: 55 U/L (ref 34–104)
ALT SERPL W P-5'-P-CCNC: 13 U/L (ref 7–52)
ANION GAP SERPL CALCULATED.3IONS-SCNC: 4 MMOL/L (ref 4–13)
AST SERPL W P-5'-P-CCNC: 16 U/L (ref 13–39)
BASOPHILS # BLD AUTO: 0.05 THOUSANDS/ΜL (ref 0–0.1)
BASOPHILS NFR BLD AUTO: 1 % (ref 0–1)
BILIRUB SERPL-MCNC: 0.49 MG/DL (ref 0.2–1)
BUN SERPL-MCNC: 13 MG/DL (ref 5–25)
CALCIUM SERPL-MCNC: 10 MG/DL (ref 8.4–10.2)
CARDIAC TROPONIN I PNL SERPL HS: <2 NG/L (ref ?–50)
CHLORIDE SERPL-SCNC: 105 MMOL/L (ref 96–108)
CO2 SERPL-SCNC: 30 MMOL/L (ref 21–32)
CREAT SERPL-MCNC: 0.71 MG/DL (ref 0.6–1.3)
D DIMER PPP FEU-MCNC: <0.27 UG/ML FEU
EOSINOPHIL # BLD AUTO: 0.06 THOUSAND/ΜL (ref 0–0.61)
EOSINOPHIL NFR BLD AUTO: 1 % (ref 0–6)
ERYTHROCYTE [DISTWIDTH] IN BLOOD BY AUTOMATED COUNT: 13 % (ref 11.6–15.1)
GFR SERPL CREATININE-BSD FRML MDRD: 89 ML/MIN/1.73SQ M
GLUCOSE SERPL-MCNC: 85 MG/DL (ref 65–140)
HCT VFR BLD AUTO: 44.3 % (ref 34.8–46.1)
HGB BLD-MCNC: 14.7 G/DL (ref 11.5–15.4)
IMM GRANULOCYTES # BLD AUTO: 0.01 THOUSAND/UL (ref 0–0.2)
IMM GRANULOCYTES NFR BLD AUTO: 0 % (ref 0–2)
LYMPHOCYTES # BLD AUTO: 2 THOUSANDS/ΜL (ref 0.6–4.47)
LYMPHOCYTES NFR BLD AUTO: 27 % (ref 14–44)
MCH RBC QN AUTO: 31.4 PG (ref 26.8–34.3)
MCHC RBC AUTO-ENTMCNC: 33.2 G/DL (ref 31.4–37.4)
MCV RBC AUTO: 95 FL (ref 82–98)
MONOCYTES # BLD AUTO: 0.41 THOUSAND/ΜL (ref 0.17–1.22)
MONOCYTES NFR BLD AUTO: 6 % (ref 4–12)
NEUTROPHILS # BLD AUTO: 4.95 THOUSANDS/ΜL (ref 1.85–7.62)
NEUTS SEG NFR BLD AUTO: 65 % (ref 43–75)
NRBC BLD AUTO-RTO: 0 /100 WBCS
PLATELET # BLD AUTO: 176 THOUSANDS/UL (ref 149–390)
PMV BLD AUTO: 10.8 FL (ref 8.9–12.7)
POTASSIUM SERPL-SCNC: 3.7 MMOL/L (ref 3.5–5.3)
PROT SERPL-MCNC: 7.6 G/DL (ref 6.4–8.4)
RBC # BLD AUTO: 4.68 MILLION/UL (ref 3.81–5.12)
SODIUM SERPL-SCNC: 139 MMOL/L (ref 135–147)
WBC # BLD AUTO: 7.48 THOUSAND/UL (ref 4.31–10.16)

## 2025-01-16 PROCEDURE — 85025 COMPLETE CBC W/AUTO DIFF WBC: CPT

## 2025-01-16 PROCEDURE — 80053 COMPREHEN METABOLIC PANEL: CPT

## 2025-01-16 PROCEDURE — 93005 ELECTROCARDIOGRAM TRACING: CPT

## 2025-01-16 PROCEDURE — 85379 FIBRIN DEGRADATION QUANT: CPT

## 2025-01-16 PROCEDURE — 99285 EMERGENCY DEPT VISIT HI MDM: CPT

## 2025-01-16 PROCEDURE — 71045 X-RAY EXAM CHEST 1 VIEW: CPT

## 2025-01-16 PROCEDURE — 36415 COLL VENOUS BLD VENIPUNCTURE: CPT

## 2025-01-16 PROCEDURE — 84484 ASSAY OF TROPONIN QUANT: CPT

## 2025-01-16 RX ORDER — FAMOTIDINE 20 MG/1
20 TABLET, FILM COATED ORAL ONCE
Status: COMPLETED | OUTPATIENT
Start: 2025-01-16 | End: 2025-01-16

## 2025-01-16 RX ORDER — SUCRALFATE 1 G/1
1 TABLET ORAL ONCE
Status: COMPLETED | OUTPATIENT
Start: 2025-01-16 | End: 2025-01-16

## 2025-01-16 RX ADMIN — FAMOTIDINE 20 MG: 20 TABLET, FILM COATED ORAL at 14:32

## 2025-01-16 RX ADMIN — SUCRALFATE 1 G: 1 TABLET ORAL at 14:32

## 2025-01-16 NOTE — ED PROVIDER NOTES
Time reflects when diagnosis was documented in both MDM as applicable and the Disposition within this note       Time User Action Codes Description Comment    1/16/2025  2:58 PM Sudhakar Lebron Add [R07.9] Chest pain           ED Disposition       ED Disposition   Discharge    Condition   Stable    Date/Time   Thu Jan 16, 2025  2:58 PM    Comment   Noy Mckeon discharge to home/self care.                   Assessment & Plan       Medical Decision Making  64-year-old female present emergency department due to chest pain.  Labs, EKG, x-ray obtained to evaluate for ACS, PE, or other intrathoracic pathology.  Workup generally reassuring.  Patient is low with low risk heart score.  Trialed GI cocktail without any improvement.  Discussed findings with patient, in setting of recent increase stressors, this may be musculoskeletal related.  Otherwise, given reassuring evaluation emergency department, doubt emergent pathology requiring further hospital management.  Patient agreeable with plan for outpatient follow-up.    Amount and/or Complexity of Data Reviewed  Labs: ordered.  Radiology: ordered.    Risk  Prescription drug management.             Medications   sucralfate (CARAFATE) tablet 1 g (1 g Oral Given 1/16/25 1432)   famotidine (PEPCID) tablet 20 mg (20 mg Oral Given 1/16/25 1432)       ED Risk Strat Scores   HEART Risk Score      Flowsheet Row Most Recent Value   Heart Score Risk Calculator    History 0 Filed at: 01/19/2025 2315   ECG 0 Filed at: 01/19/2025 2315   Age 2 Filed at: 01/19/2025 2315   Risk Factors 1 Filed at: 01/19/2025 2315   Troponin 0 Filed at: 01/19/2025 2315   HEART Score 3 Filed at: 01/19/2025 2315          HEART Risk Score      Flowsheet Row Most Recent Value   Heart Score Risk Calculator    History 0 Filed at: 01/19/2025 2315   ECG 0 Filed at: 01/19/2025 2315   Age 2 Filed at: 01/19/2025 2315   Risk Factors 1 Filed at: 01/19/2025 2315   Troponin 0 Filed at: 01/19/2025 2315   HEART  "Score 3 Filed at: 01/19/2025 1885                                                History of Present Illness       Chief Complaint   Patient presents with    Chest Pain     Pt c/o midd sternal chest pain started \"couple weeks ago\" denies pain radiating, does have pain mid shoulder blades. Pain became constant \"last couple days\" c/o sob.        Past Medical History:   Diagnosis Date    Eczema     Last Assessed 2/26/2014      Past Surgical History:   Procedure Laterality Date    BACK SURGERY      ROTATOR CUFF REPAIR Right       Family History   Problem Relation Age of Onset    Coronary artery disease Mother         Coronary Arteriosclerosis    Coronary artery disease Father     Diabetes Father         Mellitus    Hypertension Father     Coronary artery disease Sister         Premature      Social History     Tobacco Use    Smoking status: Every Day     Current packs/day: 0.50     Types: Cigarettes    Smokeless tobacco: Never    Tobacco comments:     Tobacco Use   Vaping Use    Vaping status: Unknown   Substance Use Topics    Alcohol use: Yes     Comment: occassionally    Drug use: No      E-Cigarette/Vaping    E-Cigarette Use Unknown If Ever Used       E-Cigarette/Vaping Substances    Nicotine No     THC No     CBD No     Flavoring No     Other No     Unknown No       I have reviewed and agree with the history as documented.     64-year-old female present emergency department due to chest pain.  Patient notes that over the past 2 weeks she has been having midsternal chest pain that has radiated to her shoulder blade on the left side.  The symptoms have been waxing and waning but becoming more persistent.  Has had some intermittent dyspnea with it as well.  She has had increased stressors.  Denies other associated symptoms such as fevers, chills, nausea, abdominal pain, or other complaints.        Review of Systems   All other systems reviewed and are negative.          Objective       ED Triage Vitals [01/16/25 1312] "   Temperature Pulse Blood Pressure Respirations SpO2 Patient Position - Orthostatic VS   98.1 °F (36.7 °C) 78 148/80 18 95 % Sitting      Temp Source Heart Rate Source BP Location FiO2 (%) Pain Score    Tympanic Monitor Right arm -- --      Vitals      Date and Time Temp Pulse SpO2 Resp BP Pain Score FACES Pain Rating User   01/16/25 1500 -- 67 96 % 24 122/77 -- --    01/16/25 1330 -- 69 97 % 18 138/75 -- --    01/16/25 1312 98.1 °F (36.7 °C) 78 95 % 18 148/80 -- -- JC            Physical Exam  Vitals and nursing note reviewed.   Constitutional:       General: She is not in acute distress.     Appearance: She is well-developed.   HENT:      Head: Normocephalic and atraumatic.   Eyes:      Conjunctiva/sclera: Conjunctivae normal.   Cardiovascular:      Rate and Rhythm: Normal rate and regular rhythm.      Heart sounds: No murmur heard.  Pulmonary:      Effort: Pulmonary effort is normal. No respiratory distress.      Breath sounds: Normal breath sounds.   Abdominal:      Palpations: Abdomen is soft.      Tenderness: There is no abdominal tenderness.   Musculoskeletal:         General: No swelling.      Cervical back: Neck supple.   Skin:     General: Skin is warm and dry.      Capillary Refill: Capillary refill takes less than 2 seconds.   Neurological:      Mental Status: She is alert.   Psychiatric:         Mood and Affect: Mood normal.         Results Reviewed       Procedure Component Value Units Date/Time    HS Troponin 0hr (reflex protocol) [[id number]]  (Normal) Collected: 01/16/25 1328    Lab Status: Final result Specimen: Blood from Arm, Left Updated: 01/16/25 1402     hs TnI 0hr <2 ng/L     Comprehensive metabolic panel [[id number]] Collected: 01/16/25 1328    Lab Status: Final result Specimen: Blood from Arm, Left Updated: 01/16/25 1354     Sodium 139 mmol/L      Potassium 3.7 mmol/L      Chloride 105 mmol/L      CO2 30 mmol/L      ANION GAP 4 mmol/L      BUN 13 mg/dL      Creatinine 0.71 mg/dL       Glucose 85 mg/dL      Calcium 10.0 mg/dL      AST 16 U/L      ALT 13 U/L      Alkaline Phosphatase 55 U/L      Total Protein 7.6 g/dL      Albumin 4.5 g/dL      Total Bilirubin 0.49 mg/dL      eGFR 89 ml/min/1.73sq m     Narrative:      National Kid[employer] Foundation guidelines for Chronic Kidney Disease (CKD):     Stage 1 with normal or high GFR (GFR > 90 mL/min/1.73 square meters)    Stage 2 Mild CKD (GFR = 60-89 mL/min/1.73 square meters)    Stage 3A Moderate CKD (GFR = 45-59 mL/min/1.73 square meters)    Stage 3B Moderate CKD (GFR = 30-44 mL/min/1.73 square meters)    Stage 4 Severe CKD (GFR = 15-29 mL/min/1.73 square meters)    Stage 5 End Stage CKD (GFR <15 mL/min/1.73 square meters)  Note: GFR calculation is accurate only with a steady state creatinine    D-Dimer [[id number]]  (Normal) Collected: 01/16/25 1328    Lab Status: Final result Specimen: Blood from Arm, Left Updated: 01/16/25 1353     D-Dimer, Quant <0.27 ug/ml FEU     Narrative:      In the evaluation for possible pulmonary embolism, in the appropriate (Well's Score of 4 or less) patient, the age adjusted d-dimer cutoff for this patient can be calculated as:    Age x 0.01 (in ug/mL) for Age-adjusted D-dimer exclusion threshold for a patient over 50 years.    CBC and differential [[id number]] Collected: 01/16/25 1328    Lab Status: Final result Specimen: Blood from Arm, Left Updated: 01/16/25 1336     WBC 7.48 Thousand/uL      RBC 4.68 Million/uL      Hemoglobin 14.7 g/dL      Hematocrit 44.3 %      MCV 95 fL      MCH 31.4 pg      MCHC 33.2 g/dL      RDW 13.0 %      MPV 10.8 fL      Platelets 176 Thousands/uL      nRBC 0 /100 WBCs      Segmented % 65 %      Immature Grans % 0 %      Lymphocytes % 27 %      Monocytes % 6 %      Eosinophils Relative 1 %      Basophils Relative 1 %      Absolute Neutrophils 4.95 Thousands/µL      Absolute Immature Grans 0.01 Thousand/uL      Absolute Lymphocytes 2.00 Thousands/µL      Absolute Monocytes 0.41  Thousand/µL      Eosinophils Absolute 0.06 Thousand/µL      Basophils Absolute 0.05 Thousands/µL             XR chest 1 view portable   Final Interpretation by Ajay Schafer MD (01/16 1515)      No acute cardiopulmonary disease.            Workstation performed: [id number]             ECG 12 Lead Documentation Only    Date/Time: 1/16/2025 11:15 PM    Performed by: Sudhakar Lebron MD  Authorized by: Sudhakar Lebron MD    ECG reviewed by me, the ED Provider: yes    Patient location:  ED  Previous ECG:     Previous ECG:  Unavailable  Interpretation:     Interpretation: normal    Rate:     ECG rate assessment: normal    Rhythm:     Rhythm: sinus rhythm    Ectopy:     Ectopy: none    QRS:     QRS axis:  Normal    QRS intervals:  Normal  Conduction:     Conduction: normal    ST segments:     ST segments:  Normal  T waves:     T waves: normal        ED Medication and Procedure Management   Prior to Admission Medications   Prescriptions Last Dose Informant Patient Reported? Taking?   calcium gluconate 500 mg tablet   Yes No   Sig: Take 500 mg by mouth daily   cholecalciferol (VITAMIN D3) 1,000 units tablet   Yes No   Sig: Take 1,000 Units by mouth daily   varenicline 0.5 MG X 11 & 1 MG X 42 tablet therapy pack   No No   Sig: Start 7 days before quit date: take 0.5mg tab/d x 3 days, then 0.5mg tab bid x 3 days, then 1mg tab bid<starter pack>   zinc gluconate 50 mg tablet   Yes No   Sig: Take 50 mg by mouth daily      Facility-Administered Medications: None     Discharge Medication List as of 1/16/2025  2:58 PM        CONTINUE these medications which have NOT CHANGED    Details   calcium gluconate 500 mg tablet Take 500 mg by mouth daily, Historical Med      cholecalciferol (VITAMIN D3) 1,000 units tablet Take 1,000 Units by mouth daily, Historical Med      varenicline 0.5 MG X 11 & 1 MG X 42 tablet therapy pack Start 7 days before quit date: take 0.5mg tab/d x 3 days, then 0.5mg tab bid x 3 days, then 1mg tab  bid<starter pack>, Normal      zinc gluconate 50 mg tablet Take 50 mg by mouth daily, Historical Med           No discharge procedures on file.  ED SEPSIS DOCUMENTATION   Time reflects when diagnosis was documented in both MDM as applicable and the Disposition within this note       Time User Action Codes Description Comment    1/16/2025  2:58 PM Sudhakar Lebron Add [R07.9] Chest pain                  Sudhakar Lebron MD  01/19/25 1794

## 2025-01-17 LAB
ATRIAL RATE: 73 BPM
P AXIS: 69 DEGREES
PR INTERVAL: 158 MS
QRS AXIS: 54 DEGREES
QRSD INTERVAL: 80 MS
QT INTERVAL: 408 MS
QTC INTERVAL: 450 MS
T WAVE AXIS: 63 DEGREES
VENTRICULAR RATE: 73 BPM

## 2025-01-17 PROCEDURE — 93010 ELECTROCARDIOGRAM REPORT: CPT | Performed by: INTERNAL MEDICINE
